# Patient Record
Sex: FEMALE | Race: WHITE | NOT HISPANIC OR LATINO | Employment: PART TIME | URBAN - METROPOLITAN AREA
[De-identification: names, ages, dates, MRNs, and addresses within clinical notes are randomized per-mention and may not be internally consistent; named-entity substitution may affect disease eponyms.]

---

## 2017-11-27 PROBLEM — K21.9 GASTROESOPHAGEAL REFLUX: Status: ACTIVE | Noted: 2017-11-27

## 2018-10-31 ENCOUNTER — OFFICE VISIT (OUTPATIENT)
Dept: URGENT CARE | Facility: CLINIC | Age: 73
End: 2018-10-31
Payer: MEDICARE

## 2018-10-31 VITALS
HEIGHT: 67 IN | DIASTOLIC BLOOD PRESSURE: 78 MMHG | SYSTOLIC BLOOD PRESSURE: 140 MMHG | HEART RATE: 58 BPM | WEIGHT: 156 LBS | BODY MASS INDEX: 24.48 KG/M2 | RESPIRATION RATE: 18 BRPM | TEMPERATURE: 98 F | OXYGEN SATURATION: 99 %

## 2018-10-31 DIAGNOSIS — S39.012A SACROILIAC STRAIN, INITIAL ENCOUNTER: Primary | ICD-10-CM

## 2018-10-31 PROCEDURE — 96372 THER/PROPH/DIAG INJ SC/IM: CPT | Mod: 59,S$GLB,, | Performed by: FAMILY MEDICINE

## 2018-10-31 PROCEDURE — 3077F SYST BP >= 140 MM HG: CPT | Mod: CPTII,S$GLB,, | Performed by: FAMILY MEDICINE

## 2018-10-31 PROCEDURE — 99214 OFFICE O/P EST MOD 30 MIN: CPT | Mod: 25,S$GLB,, | Performed by: FAMILY MEDICINE

## 2018-10-31 PROCEDURE — 3078F DIAST BP <80 MM HG: CPT | Mod: CPTII,S$GLB,, | Performed by: FAMILY MEDICINE

## 2018-10-31 RX ORDER — BETAMETHASONE SODIUM PHOSPHATE AND BETAMETHASONE ACETATE 3; 3 MG/ML; MG/ML
6 INJECTION, SUSPENSION INTRA-ARTICULAR; INTRALESIONAL; INTRAMUSCULAR; SOFT TISSUE
Status: COMPLETED | OUTPATIENT
Start: 2018-10-31 | End: 2018-10-31

## 2018-10-31 RX ADMIN — BETAMETHASONE SODIUM PHOSPHATE AND BETAMETHASONE ACETATE 6 MG: 3; 3 INJECTION, SUSPENSION INTRA-ARTICULAR; INTRALESIONAL; INTRAMUSCULAR; SOFT TISSUE at 02:10

## 2018-10-31 NOTE — PROGRESS NOTES
"Subjective:       Patient ID: Nany Pratt is a 73 y.o. female.    Vitals:  height is 5' 6.5" (1.689 m) and weight is 70.8 kg (156 lb). Her temperature is 97.5 °F (36.4 °C). Her blood pressure is 140/78 (abnormal) and her pulse is 58 (abnormal). Her respiration is 18 and oxygen saturation is 99%.     Chief Complaint: Hip Pain    Right hip started hurting yesterday, no injury. Took Tylenol and pain relieving cream.      Hip Pain    The incident occurred 12 to 24 hours ago. There was no injury mechanism. The pain is present in the right hip. The quality of the pain is described as aching. The pain is at a severity of 9/10. She reports no foreign bodies present. The symptoms are aggravated by palpation and weight bearing. She has tried NSAIDs for the symptoms. The treatment provided mild relief.     Review of Systems   Constitution: Negative for chills and fever.   HENT: Negative for sore throat.    Eyes: Negative for blurred vision.   Cardiovascular: Negative for chest pain.   Respiratory: Negative for shortness of breath.    Skin: Negative for rash.   Musculoskeletal: Positive for joint pain. Negative for back pain.   Gastrointestinal: Negative for abdominal pain, diarrhea, nausea and vomiting.   Neurological: Negative for headaches.   Psychiatric/Behavioral: The patient is not nervous/anxious.        Objective:      Physical Exam   Constitutional: She is oriented to person, place, and time. Vital signs are normal. She appears well-developed and well-nourished. She is active and cooperative. No distress.   HENT:   Head: Normocephalic and atraumatic.   Nose: Nose normal.   Mouth/Throat: Oropharynx is clear and moist and mucous membranes are normal.   Eyes: Conjunctivae and lids are normal.   Neck: Trachea normal, normal range of motion, full passive range of motion without pain and phonation normal. Neck supple.   Cardiovascular: Normal rate, regular rhythm, normal heart sounds, intact distal pulses and normal " pulses.   Pulmonary/Chest: Effort normal and breath sounds normal.   Abdominal: Soft. Normal appearance and bowel sounds are normal. She exhibits no abdominal bruit, no pulsatile midline mass and no mass.   Musculoskeletal: She exhibits tenderness. She exhibits no edema or deformity.   Tenderness in the right sacroiliac joint region no radiation into the hip or leg.  Straight leg raising negative bilaterally.  Lower extremity strength is normal pulses 2+ bilaterally   Neurological: She is alert and oriented to person, place, and time. She has normal strength and normal reflexes. No sensory deficit.   Skin: Skin is warm, dry and intact. She is not diaphoretic.   Psychiatric: She has a normal mood and affect. Her speech is normal and behavior is normal. Judgment and thought content normal. Cognition and memory are normal.   Nursing note and vitals reviewed.      Assessment:       1. Sacroiliac strain, initial encounter        Plan:         Sacroiliac strain, initial encounter    Other orders  -     betamethasone acetate-betamethasone sodium phosphate injection 6 mg     Range of motion exercises.  Sacroiliac strain precautions reviewed and literature provided.  Patient is stable for discharge.

## 2018-10-31 NOTE — PATIENT INSTRUCTIONS
Understanding Sacroiliac Strain    A joint is a place where 2 bones meet. The 2 sacroiliac joints are where the hip (iliac) bones meet the bottom part of the spine (sacrum). These joints are surrounded by muscle, connective tissue, and nerves. Normally, a sacroiliac joint (SIJ) does not move very much. But it can be pushed out of alignment. The tissues around an SIJ also can be stretched or torn. This can lead to pain in the low back.     How to say it  OSI-th-RU-alberto-ak strain   Causes of sacroiliac strain  Causes of SIJ strain can include:  · Stress on the SIJ from lifting weight incorrectly  · Poor body mechanics and posture during sports or work activities  · Damage from degenerative diseases such as arthritis  · Increased pressure on the SIJ from pregnancy  Symptoms of sacroiliac strain  Symptoms of SIJ strain may include:  · Aching in the low back, buttocks, or upper leg  · Pain that gets worse with movement or standing for a long time, and gets better with rest  · Inability to move as freely as usual  · Muscle spasms in the low back  Treating sacroiliac strain  Treatment focuses on reducing pain and avoiding further injury. Treatments may include:  · Prescription or over-the-counter pain medicines. These help reduce pain and swelling.  · Cold packs or heat packs. These help reduce pain and swelling.  · Stretching and other exercises. These improve flexibility and strength.  · Physical therapy. This may include exercises or other treatments.  · An SIJ belt. This medical device is worn around the hips, to make the SIJ more stable and reduce pain.  · Injections of medicine. This may relieve symptoms.  Possible complications of sacroiliac strain  If the cause of the pain is not addressed, symptoms may return or get worse. Follow your healthcare providers instructions on lifestyle changes and treating your SIJ strain.     When to call your healthcare provider  Call your healthcare provider right away if you have  any of these:  · Fever of 100.4°F (38°C) or higher, or as directed  · Redness or swelling  · Pain that gets worse  · Symptoms that dont get better with prescribed medicines, or get worse  · New symptoms   Date Last Reviewed: 3/10/2016  © 6348-8048 [employer]. [address] [place] [id number]. All rights reserved. This information is not intended as a substitute for professional medical care. Always follow your healthcare professional's instructions.

## 2019-03-01 ENCOUNTER — LAB VISIT (OUTPATIENT)
Dept: LAB | Facility: HOSPITAL | Age: 74
End: 2019-03-01
Attending: INTERNAL MEDICINE
Payer: MEDICARE

## 2019-03-01 ENCOUNTER — OFFICE VISIT (OUTPATIENT)
Dept: ENDOCRINOLOGY | Facility: CLINIC | Age: 74
End: 2019-03-01
Payer: MEDICARE

## 2019-03-01 VITALS
SYSTOLIC BLOOD PRESSURE: 140 MMHG | DIASTOLIC BLOOD PRESSURE: 64 MMHG | WEIGHT: 157.44 LBS | HEART RATE: 61 BPM | BODY MASS INDEX: 24.71 KG/M2 | HEIGHT: 67 IN

## 2019-03-01 DIAGNOSIS — R53.83 FATIGUE, UNSPECIFIED TYPE: ICD-10-CM

## 2019-03-01 DIAGNOSIS — E03.9 HYPOTHYROIDISM, UNSPECIFIED TYPE: ICD-10-CM

## 2019-03-01 DIAGNOSIS — E03.9 HYPOTHYROIDISM, UNSPECIFIED TYPE: Primary | ICD-10-CM

## 2019-03-01 DIAGNOSIS — I10 BENIGN ESSENTIAL HTN: ICD-10-CM

## 2019-03-01 LAB
BASOPHILS # BLD AUTO: 0.05 K/UL
BASOPHILS NFR BLD: 1 %
DIFFERENTIAL METHOD: ABNORMAL
EOSINOPHIL # BLD AUTO: 0.1 K/UL
EOSINOPHIL NFR BLD: 2.5 %
ERYTHROCYTE [DISTWIDTH] IN BLOOD BY AUTOMATED COUNT: 12.4 %
HCT VFR BLD AUTO: 36.8 %
HGB BLD-MCNC: 11.9 G/DL
IMM GRANULOCYTES # BLD AUTO: 0.01 K/UL
IMM GRANULOCYTES NFR BLD AUTO: 0.2 %
LYMPHOCYTES # BLD AUTO: 1.2 K/UL
LYMPHOCYTES NFR BLD: 23.5 %
MCH RBC QN AUTO: 32.8 PG
MCHC RBC AUTO-ENTMCNC: 32.3 G/DL
MCV RBC AUTO: 101 FL
MONOCYTES # BLD AUTO: 0.6 K/UL
MONOCYTES NFR BLD: 11.5 %
NEUTROPHILS # BLD AUTO: 3.2 K/UL
NEUTROPHILS NFR BLD: 61.3 %
NRBC BLD-RTO: 0 /100 WBC
PLATELET # BLD AUTO: 195 K/UL
PMV BLD AUTO: 11.9 FL
RBC # BLD AUTO: 3.63 M/UL
T4 FREE SERPL-MCNC: 1.27 NG/DL
TSH SERPL DL<=0.005 MIU/L-ACNC: 1.86 UIU/ML
WBC # BLD AUTO: 5.24 K/UL

## 2019-03-01 PROCEDURE — 84439 ASSAY OF FREE THYROXINE: CPT

## 2019-03-01 PROCEDURE — 36415 COLL VENOUS BLD VENIPUNCTURE: CPT | Mod: PO

## 2019-03-01 PROCEDURE — 3077F PR MOST RECENT SYSTOLIC BLOOD PRESSURE >= 140 MM HG: ICD-10-PCS | Mod: CPTII,S$GLB,, | Performed by: INTERNAL MEDICINE

## 2019-03-01 PROCEDURE — 85025 COMPLETE CBC W/AUTO DIFF WBC: CPT

## 2019-03-01 PROCEDURE — 3077F SYST BP >= 140 MM HG: CPT | Mod: CPTII,S$GLB,, | Performed by: INTERNAL MEDICINE

## 2019-03-01 PROCEDURE — 99204 PR OFFICE/OUTPT VISIT, NEW, LEVL IV, 45-59 MIN: ICD-10-PCS | Mod: S$GLB,,, | Performed by: INTERNAL MEDICINE

## 2019-03-01 PROCEDURE — 99204 OFFICE O/P NEW MOD 45 MIN: CPT | Mod: S$GLB,,, | Performed by: INTERNAL MEDICINE

## 2019-03-01 PROCEDURE — 84443 ASSAY THYROID STIM HORMONE: CPT

## 2019-03-01 PROCEDURE — 1101F PT FALLS ASSESS-DOCD LE1/YR: CPT | Mod: CPTII,S$GLB,, | Performed by: INTERNAL MEDICINE

## 2019-03-01 PROCEDURE — 1101F PR PT FALLS ASSESS DOC 0-1 FALLS W/OUT INJ PAST YR: ICD-10-PCS | Mod: CPTII,S$GLB,, | Performed by: INTERNAL MEDICINE

## 2019-03-01 PROCEDURE — 3078F PR MOST RECENT DIASTOLIC BLOOD PRESSURE < 80 MM HG: ICD-10-PCS | Mod: CPTII,S$GLB,, | Performed by: INTERNAL MEDICINE

## 2019-03-01 PROCEDURE — 99999 PR PBB SHADOW E&M-EST. PATIENT-LVL III: CPT | Mod: PBBFAC,,, | Performed by: INTERNAL MEDICINE

## 2019-03-01 PROCEDURE — 3078F DIAST BP <80 MM HG: CPT | Mod: CPTII,S$GLB,, | Performed by: INTERNAL MEDICINE

## 2019-03-01 PROCEDURE — 99999 PR PBB SHADOW E&M-EST. PATIENT-LVL III: ICD-10-PCS | Mod: PBBFAC,,, | Performed by: INTERNAL MEDICINE

## 2019-03-01 NOTE — PROGRESS NOTES
CHIEF COMPLAINT: Hypothyroid  73 year old being seen as a new patient. Hypothyroid since 50's. On synthroid 75 mcg. States that has been fatigued. Celexa has been increased. Overall sleeping well at night- 7-8 hours. Has had a sleep study. Was told that it was borderline and told CPAP could be tried but decided against. Takes celexa in AM. On beta blocker. Takes synthroid by itself. Has not missed doses. Scheduled to see cardiology.       PAST MEDICAL HISTORY/PAST SURGICAL HISTORY:  Reviewed in [facility]    SOCIAL HISTORY: NO T/A    FAMILY HISTORY:  Daughter with hypothyroidism. No thyroid cancer. Sister partial thyroidectomy. + DM2.     MEDICATIONS/ALLERGIES: The patient's MedCard has been updated and reviewed.      ROS:   Constitutional: weight stable.   Eyes: No recent visual changes  ENT: No dysphagia  Cardiovascular: Denies current anginal symptoms  Respiratory: Denies current respiratory difficulty  Gastrointestinal: Denies recent bowel disturbances  GenitoUrinary - No dysuria  Skin: No new skin rash  Neurologic: No focal neurologic complaints  Remainder ROS negative        PE:    GENERAL: Well developed, well nourished.  PSYCH:  appropriate mood and affect  EYES:  PERRL, EOM intact.  ENT: Nares patent, oropharynx clear, mucosa pink,   NECK: Supple, trachea midline, No palpable thyroid nodules.   CHEST: Resp even and unlabored, CTA bilateral.  CARDIAC: RRR, S1, S2 heard, no murmurs, rubs, S3, or S4  ABDOMEN: Soft, non-tender, non-distended;  No organomegaly  VASCULAR:  DP pulses +2/4 bilaterally, no edema  NEURO: Gait steady, CN II-VII grossly intact  SKIN: No areas of breakdown, no acanthosis nigracans.    LABS   Results for ELISEO MUELLER (MRN [id number]) as of 3/1/2019 11:14   Ref. Range 10/25/2018 10:44 1/30/2019 15:02   TSH Latest Ref Range: 0.400 - 4.000 uIU/mL 6.640 (H) 4.780 (H)   Free T4 Latest Ref Range: 0.78 - 2.19 ng/dL  1.39     Ultrasound of the thyroid    Clinical history is palpable lesion    This  is compared to a previous study from 4/20/15 is not measures 0.2 cm.    The right lobe measures 1.5 x 3.9 x 0.8 cm. No focal lesions are seen.    The left lobe measures 1.1 x 3.8 x 1.1 cm. No focal lesions are seen. On the previous exam there is a small cyst in the isthmus this is no longer visualized.    The echotexture of both lobes is heterogeneous.      Impression      No focal nodules are identified.           ASSESSMENT/PLAN:  1. Hypothyroid- Check TFT to see if contributing to her fatigue    2. Fatigue- See # 1. Discussed importance of sleep, stress, diet. Discussed beta blockers can contribute to fatigue. Discussed trying to take celexa in evening to see if that helps.     3. HTN- states will discuss beta blocker with cardiology.    FOLLOWUP  TSH, Ft4, CBC- today.

## 2019-03-01 NOTE — LETTER
March 6, 2019      ROSSY Gary Jr., MD  [address]  [facility] [address]           [facility]  [address] [facility] [address] [place] [address]  Phone: [phone]  Fax: [phone]          Patient: Nany Partt   MR Number: [id number]   YOB: 1945   Date of Visit: 3/1/2019       Dear Dr. ROSSY Gary Jr.:    Thank you for referring Nany Pratt to me for evaluation. Attached you will find relevant portions of my assessment and plan of care.    If you have questions, please do not hesitate to call me. I look forward to following Nany Pratt along with you.    Sincerely,    Modesto Deluna,     Enclosure  CC:  No Recipients    If you would like to receive this communication electronically, please contact [email or web address] or [phone] to request more information on [employer] access.    For providers and/or their staff who would like to refer a patient to [facility], please contact us through our one-stop-shop provider referral line, [facility] , at [phone].    If you feel you have received this communication in error or would no longer like to receive these types of communications, please e-mail [email or web address]

## 2019-03-05 ENCOUNTER — TELEPHONE (OUTPATIENT)
Dept: ENDOCRINOLOGY | Facility: CLINIC | Age: 74
End: 2019-03-05

## 2019-03-05 NOTE — TELEPHONE ENCOUNTER
Let her know that her thyroid levels are normal. He is slightly anemic. Will need to discuss with PCP for further w/u. Since mildly anemic unsure if that is contributing to fatigue

## 2019-03-06 ENCOUNTER — TELEPHONE (OUTPATIENT)
Dept: ENDOCRINOLOGY | Facility: CLINIC | Age: 74
End: 2019-03-06

## 2019-03-06 NOTE — TELEPHONE ENCOUNTER
----- Message from Jonnie York sent at 3/6/2019 10:35 AM CST -----  Contact: same  Type:  Test Results    Who Called:  patient  Name of Test (Lab/Mammo/Etc):  labs  Date of Test:  3/1/19  Ordering Provider:  Mikie  Where the test was performed:  [address] [facility] [address]  Best Call Back Number:  [phone]  Additional Information:  n/a

## 2019-08-28 PROBLEM — R59.0 ANTERIOR CERVICAL ADENOPATHY: Status: ACTIVE | Noted: 2019-08-28

## 2019-08-28 PROBLEM — E04.1 THYROID NODULE: Status: ACTIVE | Noted: 2019-08-28

## 2019-08-28 PROBLEM — I65.23 ATHEROSCLEROSIS OF BOTH CAROTID ARTERIES: Status: ACTIVE | Noted: 2019-08-28

## 2019-09-13 PROBLEM — E04.1 NONTOXIC UNINODULAR GOITER: Status: RESOLVED | Noted: 2019-08-28 | Resolved: 2019-09-13

## 2020-03-09 PROBLEM — K58.9 IBS (IRRITABLE BOWEL SYNDROME): Status: ACTIVE | Noted: 2020-03-09

## 2020-09-10 PROBLEM — D64.9 ANEMIA: Status: ACTIVE | Noted: 2020-09-10

## 2020-09-10 PROBLEM — K21.9 GASTROESOPHAGEAL REFLUX: Status: RESOLVED | Noted: 2017-11-27 | Resolved: 2020-09-10

## 2020-09-10 PROBLEM — R59.0 ANTERIOR CERVICAL ADENOPATHY: Status: RESOLVED | Noted: 2019-08-28 | Resolved: 2020-09-10

## 2021-03-29 ENCOUNTER — DOCUMENT SCAN (OUTPATIENT)
Dept: HOME HEALTH SERVICES | Facility: HOSPITAL | Age: 76
End: 2021-03-29

## 2021-04-26 ENCOUNTER — EXTERNAL HOME HEALTH (OUTPATIENT)
Dept: HOME HEALTH SERVICES | Facility: HOSPITAL | Age: 76
End: 2021-04-26

## 2021-05-19 PROBLEM — J69.0 ASPIRATION PNEUMONITIS: Status: ACTIVE | Noted: 2021-05-19

## 2021-05-19 PROBLEM — T14.91XA SUICIDE ATTEMPT: Status: ACTIVE | Noted: 2021-05-19

## 2021-05-19 PROBLEM — Z71.89 ACP (ADVANCE CARE PLANNING): Status: ACTIVE | Noted: 2021-05-19

## 2021-05-19 PROBLEM — K92.0 HEMATEMESIS: Status: ACTIVE | Noted: 2021-05-19

## 2021-05-19 PROBLEM — R09.2 RESPIRATORY ARREST: Status: ACTIVE | Noted: 2021-05-19

## 2021-05-24 PROBLEM — R41.0 CONFUSION: Status: ACTIVE | Noted: 2021-05-24

## 2021-05-24 PROBLEM — R33.9 URINE RETENTION: Status: ACTIVE | Noted: 2021-05-24

## 2021-05-25 PROBLEM — E87.6 HYPOKALEMIA: Status: ACTIVE | Noted: 2021-05-25

## 2021-05-25 PROBLEM — I48.91 ATRIAL FIBRILLATION WITH RVR: Status: ACTIVE | Noted: 2021-05-25

## 2021-05-28 PROBLEM — E83.39 HYPOPHOSPHATEMIA: Status: ACTIVE | Noted: 2021-05-28

## 2021-05-30 PROBLEM — R19.5 LOOSE STOOLS: Status: ACTIVE | Noted: 2021-05-30

## 2021-06-01 PROBLEM — G92.9 TOXIC ENCEPHALOPATHY: Status: ACTIVE | Noted: 2021-05-24

## 2021-06-01 PROBLEM — E83.39 HYPOPHOSPHATEMIA: Status: RESOLVED | Noted: 2021-05-28 | Resolved: 2021-06-01

## 2021-06-01 PROBLEM — J69.0 ASPIRATION PNEUMONITIS: Status: RESOLVED | Noted: 2021-05-19 | Resolved: 2021-06-01

## 2021-06-01 PROBLEM — R09.2 RESPIRATORY ARREST: Status: RESOLVED | Noted: 2021-05-19 | Resolved: 2021-06-01

## 2021-06-01 PROBLEM — G92.9 TOXIC ENCEPHALOPATHY: Status: RESOLVED | Noted: 2021-05-24 | Resolved: 2021-06-01

## 2021-06-01 PROBLEM — I48.91 ATRIAL FIBRILLATION WITH RVR: Status: RESOLVED | Noted: 2021-05-25 | Resolved: 2021-06-01

## 2021-06-01 PROBLEM — E87.6 HYPOKALEMIA: Status: RESOLVED | Noted: 2021-05-25 | Resolved: 2021-06-01

## 2021-06-01 PROBLEM — T14.91XA SUICIDE ATTEMPT: Status: RESOLVED | Noted: 2021-05-19 | Resolved: 2021-06-01

## 2021-06-01 PROBLEM — K92.0 HEMATEMESIS: Status: RESOLVED | Noted: 2021-05-19 | Resolved: 2021-06-01

## 2021-11-15 ENCOUNTER — TELEPHONE (OUTPATIENT)
Dept: OBSTETRICS AND GYNECOLOGY | Facility: CLINIC | Age: 76
End: 2021-11-15
Payer: MEDICARE

## 2021-11-16 ENCOUNTER — TELEPHONE (OUTPATIENT)
Dept: OBSTETRICS AND GYNECOLOGY | Facility: CLINIC | Age: 76
End: 2021-11-16
Payer: MEDICARE

## 2021-11-16 DIAGNOSIS — Z01.419 ENCOUNTER FOR ANNUAL ROUTINE GYNECOLOGICAL EXAMINATION: Primary | ICD-10-CM

## 2021-11-16 DIAGNOSIS — M85.88 OSTEOPENIA OF LUMBAR SPINE: ICD-10-CM

## 2022-01-28 ENCOUNTER — LAB VISIT (OUTPATIENT)
Dept: PRIMARY CARE CLINIC | Facility: OTHER | Age: 77
End: 2022-01-28
Payer: MEDICARE

## 2022-01-28 DIAGNOSIS — Z20.822 ENCOUNTER FOR LABORATORY TESTING FOR COVID-19 VIRUS: ICD-10-CM

## 2022-01-28 PROCEDURE — U0003 INFECTIOUS AGENT DETECTION BY NUCLEIC ACID (DNA OR RNA); SEVERE ACUTE RESPIRATORY SYNDROME CORONAVIRUS 2 (SARS-COV-2) (CORONAVIRUS DISEASE [COVID-19]), AMPLIFIED PROBE TECHNIQUE, MAKING USE OF HIGH THROUGHPUT TECHNOLOGIES AS DESCRIBED BY CMS-2020-01-R: HCPCS

## 2022-01-29 LAB
SARS-COV-2 RNA RESP QL NAA+PROBE: NOT DETECTED
SARS-COV-2- CYCLE NUMBER: NORMAL

## 2022-07-30 PROBLEM — K58.9 IBS (IRRITABLE BOWEL SYNDROME): Chronic | Status: ACTIVE | Noted: 2020-03-09

## 2022-07-30 PROBLEM — K21.9 GASTROESOPHAGEAL REFLUX: Chronic | Status: RESOLVED | Noted: 2017-11-27 | Resolved: 2020-09-10

## 2022-08-01 PROBLEM — F32.2 SEVERE MAJOR DEPRESSION: Status: ACTIVE | Noted: 2022-08-01

## 2022-09-27 ENCOUNTER — TELEPHONE (OUTPATIENT)
Dept: OBSTETRICS AND GYNECOLOGY | Facility: CLINIC | Age: 77
End: 2022-09-27
Payer: MEDICARE

## 2022-09-27 DIAGNOSIS — Z12.31 ENCOUNTER FOR SCREENING MAMMOGRAM FOR MALIGNANT NEOPLASM OF BREAST: Primary | ICD-10-CM

## 2022-09-27 NOTE — TELEPHONE ENCOUNTER
----- Message from Marguerite Hagan sent at 9/27/2022  2:05 PM CDT -----  Type: Needs Medical Advice  Who Called: Pt   Symptoms (please be specific):   How long has patient had these symptoms:    Pharmacy name and phone #:    Best Call Back Number: [phone]  Additional Information: Pt requesting a call back concerning getting Mammogram Orders.

## 2022-10-25 ENCOUNTER — OFFICE VISIT (OUTPATIENT)
Dept: OBSTETRICS AND GYNECOLOGY | Facility: CLINIC | Age: 77
End: 2022-10-25
Payer: MEDICARE

## 2022-10-25 VITALS
SYSTOLIC BLOOD PRESSURE: 144 MMHG | BODY MASS INDEX: 28.29 KG/M2 | WEIGHT: 175.25 LBS | DIASTOLIC BLOOD PRESSURE: 90 MMHG

## 2022-10-25 DIAGNOSIS — Z01.419 ENCOUNTER FOR ANNUAL ROUTINE GYNECOLOGICAL EXAMINATION: ICD-10-CM

## 2022-10-25 DIAGNOSIS — Z12.4 SCREENING FOR MALIGNANT NEOPLASM OF CERVIX: Primary | ICD-10-CM

## 2022-10-25 PROCEDURE — 1160F RVW MEDS BY RX/DR IN RCRD: CPT | Mod: CPTII,S$GLB,, | Performed by: OBSTETRICS & GYNECOLOGY

## 2022-10-25 PROCEDURE — 3080F DIAST BP >= 90 MM HG: CPT | Mod: CPTII,S$GLB,, | Performed by: OBSTETRICS & GYNECOLOGY

## 2022-10-25 PROCEDURE — 1159F PR MEDICATION LIST DOCUMENTED IN MEDICAL RECORD: ICD-10-PCS | Mod: CPTII,S$GLB,, | Performed by: OBSTETRICS & GYNECOLOGY

## 2022-10-25 PROCEDURE — 1160F PR REVIEW ALL MEDS BY PRESCRIBER/CLIN PHARMACIST DOCUMENTED: ICD-10-PCS | Mod: CPTII,S$GLB,, | Performed by: OBSTETRICS & GYNECOLOGY

## 2022-10-25 PROCEDURE — 3288F PR FALLS RISK ASSESSMENT DOCUMENTED: ICD-10-PCS | Mod: CPTII,S$GLB,, | Performed by: OBSTETRICS & GYNECOLOGY

## 2022-10-25 PROCEDURE — 1126F PR PAIN SEVERITY QUANTIFIED, NO PAIN PRESENT: ICD-10-PCS | Mod: CPTII,S$GLB,, | Performed by: OBSTETRICS & GYNECOLOGY

## 2022-10-25 PROCEDURE — 99999 PR PBB SHADOW E&M-EST. PATIENT-LVL III: CPT | Mod: PBBFAC,,, | Performed by: OBSTETRICS & GYNECOLOGY

## 2022-10-25 PROCEDURE — 1101F PT FALLS ASSESS-DOCD LE1/YR: CPT | Mod: CPTII,S$GLB,, | Performed by: OBSTETRICS & GYNECOLOGY

## 2022-10-25 PROCEDURE — 3077F PR MOST RECENT SYSTOLIC BLOOD PRESSURE >= 140 MM HG: ICD-10-PCS | Mod: CPTII,S$GLB,, | Performed by: OBSTETRICS & GYNECOLOGY

## 2022-10-25 PROCEDURE — 99999 PR PBB SHADOW E&M-EST. PATIENT-LVL III: ICD-10-PCS | Mod: PBBFAC,,, | Performed by: OBSTETRICS & GYNECOLOGY

## 2022-10-25 PROCEDURE — 1159F MED LIST DOCD IN RCRD: CPT | Mod: CPTII,S$GLB,, | Performed by: OBSTETRICS & GYNECOLOGY

## 2022-10-25 PROCEDURE — 3288F FALL RISK ASSESSMENT DOCD: CPT | Mod: CPTII,S$GLB,, | Performed by: OBSTETRICS & GYNECOLOGY

## 2022-10-25 PROCEDURE — 3077F SYST BP >= 140 MM HG: CPT | Mod: CPTII,S$GLB,, | Performed by: OBSTETRICS & GYNECOLOGY

## 2022-10-25 PROCEDURE — 1126F AMNT PAIN NOTED NONE PRSNT: CPT | Mod: CPTII,S$GLB,, | Performed by: OBSTETRICS & GYNECOLOGY

## 2022-10-25 PROCEDURE — G0101 PR CA SCREEN;PELVIC/BREAST EXAM: ICD-10-PCS | Mod: S$GLB,,, | Performed by: OBSTETRICS & GYNECOLOGY

## 2022-10-25 PROCEDURE — 3080F PR MOST RECENT DIASTOLIC BLOOD PRESSURE >= 90 MM HG: ICD-10-PCS | Mod: CPTII,S$GLB,, | Performed by: OBSTETRICS & GYNECOLOGY

## 2022-10-25 PROCEDURE — 88175 CYTOPATH C/V AUTO FLUID REDO: CPT | Performed by: OBSTETRICS & GYNECOLOGY

## 2022-10-25 PROCEDURE — G0101 CA SCREEN;PELVIC/BREAST EXAM: HCPCS | Mod: S$GLB,,, | Performed by: OBSTETRICS & GYNECOLOGY

## 2022-10-25 PROCEDURE — 1101F PR PT FALLS ASSESS DOC 0-1 FALLS W/OUT INJ PAST YR: ICD-10-PCS | Mod: CPTII,S$GLB,, | Performed by: OBSTETRICS & GYNECOLOGY

## 2022-10-25 RX ORDER — NYSTATIN AND TRIAMCINOLONE ACETONIDE 100000; 1 [USP'U]/G; MG/G
CREAM TOPICAL
Qty: 30 G | Refills: 1 | Status: SHIPPED | OUTPATIENT
Start: 2022-10-25 | End: 2023-01-13 | Stop reason: ALTCHOICE

## 2022-10-25 RX ORDER — CLOBETASOL PROPIONATE 0.5 MG/G
CREAM TOPICAL 2 TIMES DAILY
Qty: 1 G | Refills: 1 | Status: SHIPPED | OUTPATIENT
Start: 2022-10-25 | End: 2023-01-13 | Stop reason: ALTCHOICE

## 2022-10-25 NOTE — PROGRESS NOTES
Chief Complaint   Patient presents with    [facility]    Well Woman    Vulvar Itch     C/o itching on/off        History and Physical:  No LMP recorded. Patient has had a hysterectomy.       Nany Pratt is a 77 y.o. WF who presents today for her routine annual GYN exam. The patient has no Gynecology complaints today. hypothyroid      Allergies:   Review of patient's allergies indicates:   Allergen Reactions    Budeprion sr [bupropion hcl] Palpitations    Codeine     Effexor [venlafaxine] Other (See Comments)     vomiting    Sulfa (sulfonamide antibiotics)        Past Medical History:   Diagnosis Date    Depression     Disorder of bone and cartilage, unspecified     Dyslipidemia 9/30/2015    Gastric ulcer, unspecified as acute or chronic, without mention of hemorrhage, perforation, or obstruction     GERD (gastroesophageal reflux disease)     Hypertension     Hypothyroidism     Irritable bowel syndrome     Irritable bowel syndrome with constipation and diarrhea     Mental disorder     Mitral valve insufficiency and aortic valve insufficiency     Nontoxic uninodular goiter     Occlusion and stenosis of carotid artery without mention of cerebral infarction     Paroxysmal tachycardia, unspecified     Premature supraventricular beats 9/30/2015    Rheumatic tricuspid valve regurgitation     Symptomatic menopausal or female climacteric states        Past Surgical History:   Procedure Laterality Date    APPENDECTOMY      AUGMENTATION OF BREAST      BREAST BIOPSY Left     over 15 years ago    BREAST SURGERY Bilateral     implants ruptured right implant    COLONOSCOPY  05/26/2011    Done by Dr. OSVALDO Fabian -- report in [facility]    COLONOSCOPY N/A 6/20/2016    Procedure: COLONOSCOPY;  Surgeon: Luca Quevedo MD;  Location: [facility];  Service: Endoscopy;  Laterality: N/A;    COLONOSCOPY  12/08/2021    Dr. Quevedo    HYSTERECTOMY      TOTAL ABDOMINAL HYSTERECTOMY W/ BILATERAL SALPINGOOPHORECTOMY      age 34    TOTAL  KNEE ARTHROPLASTY Right 03/16/2021    Done by Dr. Gray at [facility]:   Current Outpatient Medications on File Prior to Visit   Medication Sig Dispense Refill    DULoxetine 40 mg CpDR Take 1 capsule by mouth once daily.      gabapentin (NEURONTIN) 100 MG capsule TAKE 2 CAPSULES BY MOUTH 3 TIMES A DAY FOR MOOD ANXIETY/MOOD/PAIN      levothyroxine (SYNTHROID) 75 MCG tablet TAKE 1 TABLET(75 MCG) BY MOUTH BEFORE BREAKFAST 90 tablet 3    metoprolol succinate (TOPROL-XL) 50 MG 24 hr tablet TAKE 1 TABLET BY MOUTH EVERY DAY 30 tablet 6    mirtazapine (REMERON) 30 MG tablet Take 30 mg by mouth every evening.      multivitamin capsule Take 1 capsule by mouth once daily.      UNABLE TO FIND Take 500 mg by mouth once daily at 6am. Viactive 500 mg 1 Tablet Daily       Current Facility-Administered Medications on File Prior to Visit   Medication Dose Route Frequency Provider Last Rate Last Admin    EUFLEXXA injection Syrg 20 mg  20 mg Intra-articular  Hayden Ramirez MD   20 mg at 03/14/17 1640       OB History    No obstetric history on file.         Social History     Socioeconomic History    Marital status:      Spouse name: Evan    Number of children: 2   Occupational History    Occupation: retired   Tobacco Use    Smoking status: Never    Smokeless tobacco: Never   Substance and Sexual Activity    Alcohol use: Not Currently     Alcohol/week: 1.0 standard drink     Types: 1 Glasses of wine per week     Comment: rare    Drug use: Never    Sexual activity: Yes     Partners: Male     Birth control/protection: Post-menopausal, See Surgical Hx, None       Family History   Problem Relation Age of Onset    Hypertension Mother     Osteoarthritis Mother     Diabetes Father     Hypertension Father     Stroke Father     Cancer Sister          Past medical and surgical history reviewed.   I have reviewed the patient's medical history in detail and updated the computerized patient record.        Review of System:   General:  no chills, fever, night sweats, weight gain or weight loss  Psychological: no depression or suicidal ideation  Breasts: no new or changing breast lumps, nipple discharge or masses.  Respiratory: no cough, shortness of breath, or wheezing  Cardiovascular: no chest pain or dyspnea on exertion  Gastrointestinal: no abdominal pain, change in bowel habits, or black or bloody stools  Genito-Urinary: no incontinence, urinary frequency/urgency or vulvar/vaginal symptoms, pelvic pain or abnormal vaginal bleeding.  Musculoskeletal: no gait disturbance or muscular weakness      Physical Exam:   BP (!) 144/90   Wt 79.5 kg (175 lb 4.3 oz)   BMI 28.29 kg/m²   Constitutional: She is oriented to person, place, and time. She appears well-developed and well-nourished. No distress.  HENT:   Head: Normocephalic and atraumatic.   Eyes: Conjunctivae and EOM are normal. No scleral icterus.   Neck: Normal range of motion. Neck supple. No tracheal deviation present.   Cardiovascular: Normal rate.    Pulmonary/Chest: Effort normal. No respiratory distress. She exhibits no tenderness.  Breasts: are symmetrical.no masses   Right breast exhibits no inverted nipple, no mass, no nipple discharge, no skin change and no tenderness.   Left breast exhibits no inverted nipple, no mass, no nipple discharge, no skin change and no tenderness.  Abdominal: Soft. She exhibits no distension and no mass. There is no tenderness. There is no rebound and no guarding.   Genitourinary:    External rectal exam shows no thrombosed external hemorrhoids.    Pelvic exam was performed with patient supine.   No labial fusion. Erythema at clitoris   There is no rash, lesion or injury on the right labia.   There is no rash, lesion or injury on the left labia.   No bleeding and no signs of injury around the vaginal introitus, urethra is without lesions and well supported.    No vaginal discharge found. Menopause  Hypothyroid     No significant Cystocele, Enterocele or  rectocele, and cuff well supported.   Bimanual exam:   The urethra and vagina are without palpable masses or tenderness.   Uterus and cervix are surgically absents, vaginal cuff is intact and well supported.   Right adnexum displays no mass and no tenderness.   Left adnexum displays no mass and no tenderness.  Musculoskeletal: Normal range of motion.   Lymphadenopathy: No inguinal adenopathy present.   Neurological: She is alert and oriented to person, place, and time. Coordination normal.   Skin: Skin is warm and dry. She is not diaphoretic.   Psychiatric: She has a normal mood and affect.        Assessment:   Normal annual GYN exam  1. Screening for malignant neoplasm of cervix        2. Encounter for annual routine gynecological examination        Vulvitis possible lichen sclerosus  Hypothyroid  Menopause  Hx anxiety and depression doing well    Plan:   Mycolog 1 week then clobetasol 1 week  PAP    Mammogram  Follow up in 2 -3 weeks

## 2022-11-15 ENCOUNTER — OFFICE VISIT (OUTPATIENT)
Dept: OBSTETRICS AND GYNECOLOGY | Facility: CLINIC | Age: 77
End: 2022-11-15
Payer: MEDICARE

## 2022-11-15 VITALS
SYSTOLIC BLOOD PRESSURE: 168 MMHG | DIASTOLIC BLOOD PRESSURE: 98 MMHG | BODY MASS INDEX: 28.36 KG/M2 | WEIGHT: 175.69 LBS

## 2022-11-15 DIAGNOSIS — N90.4 VULVAR DYSTROPHY: Primary | ICD-10-CM

## 2022-11-15 PROCEDURE — 3077F SYST BP >= 140 MM HG: CPT | Mod: CPTII,S$GLB,, | Performed by: OBSTETRICS & GYNECOLOGY

## 2022-11-15 PROCEDURE — 3288F FALL RISK ASSESSMENT DOCD: CPT | Mod: CPTII,S$GLB,, | Performed by: OBSTETRICS & GYNECOLOGY

## 2022-11-15 PROCEDURE — 3080F DIAST BP >= 90 MM HG: CPT | Mod: CPTII,S$GLB,, | Performed by: OBSTETRICS & GYNECOLOGY

## 2022-11-15 PROCEDURE — 99999 PR PBB SHADOW E&M-EST. PATIENT-LVL III: ICD-10-PCS | Mod: PBBFAC,,, | Performed by: OBSTETRICS & GYNECOLOGY

## 2022-11-15 PROCEDURE — 99213 PR OFFICE/OUTPT VISIT, EST, LEVL III, 20-29 MIN: ICD-10-PCS | Mod: S$GLB,,, | Performed by: OBSTETRICS & GYNECOLOGY

## 2022-11-15 PROCEDURE — 1126F AMNT PAIN NOTED NONE PRSNT: CPT | Mod: CPTII,S$GLB,, | Performed by: OBSTETRICS & GYNECOLOGY

## 2022-11-15 PROCEDURE — 1160F PR REVIEW ALL MEDS BY PRESCRIBER/CLIN PHARMACIST DOCUMENTED: ICD-10-PCS | Mod: CPTII,S$GLB,, | Performed by: OBSTETRICS & GYNECOLOGY

## 2022-11-15 PROCEDURE — 1160F RVW MEDS BY RX/DR IN RCRD: CPT | Mod: CPTII,S$GLB,, | Performed by: OBSTETRICS & GYNECOLOGY

## 2022-11-15 PROCEDURE — 3080F PR MOST RECENT DIASTOLIC BLOOD PRESSURE >= 90 MM HG: ICD-10-PCS | Mod: CPTII,S$GLB,, | Performed by: OBSTETRICS & GYNECOLOGY

## 2022-11-15 PROCEDURE — 1101F PT FALLS ASSESS-DOCD LE1/YR: CPT | Mod: CPTII,S$GLB,, | Performed by: OBSTETRICS & GYNECOLOGY

## 2022-11-15 PROCEDURE — 1159F MED LIST DOCD IN RCRD: CPT | Mod: CPTII,S$GLB,, | Performed by: OBSTETRICS & GYNECOLOGY

## 2022-11-15 PROCEDURE — 1126F PR PAIN SEVERITY QUANTIFIED, NO PAIN PRESENT: ICD-10-PCS | Mod: CPTII,S$GLB,, | Performed by: OBSTETRICS & GYNECOLOGY

## 2022-11-15 PROCEDURE — 99999 PR PBB SHADOW E&M-EST. PATIENT-LVL III: CPT | Mod: PBBFAC,,, | Performed by: OBSTETRICS & GYNECOLOGY

## 2022-11-15 PROCEDURE — 1101F PR PT FALLS ASSESS DOC 0-1 FALLS W/OUT INJ PAST YR: ICD-10-PCS | Mod: CPTII,S$GLB,, | Performed by: OBSTETRICS & GYNECOLOGY

## 2022-11-15 PROCEDURE — 1159F PR MEDICATION LIST DOCUMENTED IN MEDICAL RECORD: ICD-10-PCS | Mod: CPTII,S$GLB,, | Performed by: OBSTETRICS & GYNECOLOGY

## 2022-11-15 PROCEDURE — 99213 OFFICE O/P EST LOW 20 MIN: CPT | Mod: S$GLB,,, | Performed by: OBSTETRICS & GYNECOLOGY

## 2022-11-15 PROCEDURE — 3077F PR MOST RECENT SYSTOLIC BLOOD PRESSURE >= 140 MM HG: ICD-10-PCS | Mod: CPTII,S$GLB,, | Performed by: OBSTETRICS & GYNECOLOGY

## 2022-11-15 PROCEDURE — 3288F PR FALLS RISK ASSESSMENT DOCUMENTED: ICD-10-PCS | Mod: CPTII,S$GLB,, | Performed by: OBSTETRICS & GYNECOLOGY

## 2022-11-15 NOTE — PROGRESS NOTES
Chief Complaint   Patient presents with    Follow-up     Fu vaginal check after using cream       History of Present Illness   77 y.o. WF  No obstetric history on file. patient presents today for follow up vulvar dystrophy treated with temovate, got better    Past medical and surgical history reviewed.   I have reviewed the patient's medical history in detail and updated the computerized patient record.    Review of patient's allergies indicates:   Allergen Reactions    Budeprion sr [bupropion hcl] Palpitations    Codeine     Effexor [venlafaxine] Other (See Comments)     vomiting    Sulfa (sulfonamide antibiotics)          Review of Systems -   GEN ROS: negative for - fever  Breast ROS: negative for breast lumps  Genito-Urinary ROS: vulvar dystrophy      Physical Examination:  BP (!) 168/98   Wt 79.7 kg (175 lb 11.3 oz)   BMI 28.36 kg/m²    Abd: non tender  V,v; no lesions, reolved      Assessment:  Improved post temovate  1. Vulvar dystrophy            Plan:  annual  Patient informed will be contacted with results within 2 weeks. Encouraged to please call back or email if she has not heard from us by then.

## 2023-01-13 PROBLEM — T40.3X1A: Status: ACTIVE | Noted: 2023-01-13

## 2023-01-13 PROBLEM — D69.6 THROMBOCYTOPENIA, UNSPECIFIED: Status: ACTIVE | Noted: 2023-01-13

## 2023-03-24 ENCOUNTER — OFFICE VISIT (OUTPATIENT)
Dept: PAIN MEDICINE | Facility: CLINIC | Age: 78
End: 2023-03-24
Payer: MEDICARE

## 2023-03-24 ENCOUNTER — HOSPITAL ENCOUNTER (OUTPATIENT)
Dept: RADIOLOGY | Facility: HOSPITAL | Age: 78
Discharge: HOME | End: 2023-03-24
Attending: PHYSICIAN ASSISTANT
Payer: MEDICARE

## 2023-03-24 VITALS
HEIGHT: 66 IN | HEART RATE: 77 BPM | BODY MASS INDEX: 27.07 KG/M2 | DIASTOLIC BLOOD PRESSURE: 85 MMHG | WEIGHT: 168.44 LBS | SYSTOLIC BLOOD PRESSURE: 144 MMHG

## 2023-03-24 DIAGNOSIS — S32.020A COMPRESSION FRACTURE OF L2 VERTEBRA, INITIAL ENCOUNTER: ICD-10-CM

## 2023-03-24 DIAGNOSIS — S32.020A COMPRESSION FRACTURE OF L2 VERTEBRA, INITIAL ENCOUNTER: Primary | ICD-10-CM

## 2023-03-24 PROCEDURE — 3077F PR MOST RECENT SYSTOLIC BLOOD PRESSURE >= 140 MM HG: ICD-10-PCS | Mod: CPTII,S$GLB,, | Performed by: PHYSICIAN ASSISTANT

## 2023-03-24 PROCEDURE — 1159F PR MEDICATION LIST DOCUMENTED IN MEDICAL RECORD: ICD-10-PCS | Mod: CPTII,S$GLB,, | Performed by: PHYSICIAN ASSISTANT

## 2023-03-24 PROCEDURE — 99203 PR OFFICE/OUTPT VISIT, NEW, LEVL III, 30-44 MIN: ICD-10-PCS | Mod: S$GLB,,, | Performed by: PHYSICIAN ASSISTANT

## 2023-03-24 PROCEDURE — 1100F PR PT FALLS ASSESS DOC 2+ FALLS/FALL W/INJURY/YR: ICD-10-PCS | Mod: CPTII,S$GLB,, | Performed by: PHYSICIAN ASSISTANT

## 2023-03-24 PROCEDURE — 1100F PTFALLS ASSESS-DOCD GE2>/YR: CPT | Mod: CPTII,S$GLB,, | Performed by: PHYSICIAN ASSISTANT

## 2023-03-24 PROCEDURE — 99999 PR PBB SHADOW E&M-EST. PATIENT-LVL IV: ICD-10-PCS | Mod: PBBFAC,,, | Performed by: PHYSICIAN ASSISTANT

## 2023-03-24 PROCEDURE — 72100 X-RAY EXAM L-S SPINE 2/3 VWS: CPT | Mod: 26,,, | Performed by: RADIOLOGY

## 2023-03-24 PROCEDURE — 1160F RVW MEDS BY RX/DR IN RCRD: CPT | Mod: CPTII,S$GLB,, | Performed by: PHYSICIAN ASSISTANT

## 2023-03-24 PROCEDURE — 3077F SYST BP >= 140 MM HG: CPT | Mod: CPTII,S$GLB,, | Performed by: PHYSICIAN ASSISTANT

## 2023-03-24 PROCEDURE — 3079F PR MOST RECENT DIASTOLIC BLOOD PRESSURE 80-89 MM HG: ICD-10-PCS | Mod: CPTII,S$GLB,, | Performed by: PHYSICIAN ASSISTANT

## 2023-03-24 PROCEDURE — 3288F PR FALLS RISK ASSESSMENT DOCUMENTED: ICD-10-PCS | Mod: CPTII,S$GLB,, | Performed by: PHYSICIAN ASSISTANT

## 2023-03-24 PROCEDURE — 99203 OFFICE O/P NEW LOW 30 MIN: CPT | Mod: S$GLB,,, | Performed by: PHYSICIAN ASSISTANT

## 2023-03-24 PROCEDURE — 99999 PR PBB SHADOW E&M-EST. PATIENT-LVL IV: CPT | Mod: PBBFAC,,, | Performed by: PHYSICIAN ASSISTANT

## 2023-03-24 PROCEDURE — 1159F MED LIST DOCD IN RCRD: CPT | Mod: CPTII,S$GLB,, | Performed by: PHYSICIAN ASSISTANT

## 2023-03-24 PROCEDURE — 1125F PR PAIN SEVERITY QUANTIFIED, PAIN PRESENT: ICD-10-PCS | Mod: CPTII,S$GLB,, | Performed by: PHYSICIAN ASSISTANT

## 2023-03-24 PROCEDURE — 72100 X-RAY EXAM L-S SPINE 2/3 VWS: CPT | Mod: TC,PO

## 2023-03-24 PROCEDURE — 1160F PR REVIEW ALL MEDS BY PRESCRIBER/CLIN PHARMACIST DOCUMENTED: ICD-10-PCS | Mod: CPTII,S$GLB,, | Performed by: PHYSICIAN ASSISTANT

## 2023-03-24 PROCEDURE — 1125F AMNT PAIN NOTED PAIN PRSNT: CPT | Mod: CPTII,S$GLB,, | Performed by: PHYSICIAN ASSISTANT

## 2023-03-24 PROCEDURE — 3079F DIAST BP 80-89 MM HG: CPT | Mod: CPTII,S$GLB,, | Performed by: PHYSICIAN ASSISTANT

## 2023-03-24 PROCEDURE — 72100 XR LUMBAR SPINE AP AND LATERAL: ICD-10-PCS | Mod: 26,,, | Performed by: RADIOLOGY

## 2023-03-24 PROCEDURE — 3288F FALL RISK ASSESSMENT DOCD: CPT | Mod: CPTII,S$GLB,, | Performed by: PHYSICIAN ASSISTANT

## 2023-03-24 RX ORDER — TIZANIDINE 4 MG/1
4 TABLET ORAL NIGHTLY PRN
Qty: 40 TABLET | Refills: 0 | Status: SHIPPED | OUTPATIENT
Start: 2023-03-24 | End: 2023-08-04 | Stop reason: ALTCHOICE

## 2023-03-24 NOTE — PROGRESS NOTES
[facility] New Patient Evaluation      Referred by: Loren Garza    PCP:  Elgin Gary Jr, MD    CC:   Chief Complaint   Patient presents with    Low-back Pain     L2 compression fracture after fall.      No flowsheet data found.      HPI:   Nany Pratt is a 77 y.o. female with history of hypertension, IBS, osteopenia, depression presents with acute onset lower back pain after a fall. She fell from 2-step ladder landing on her buttocks about 2-25-23.  She developed pain I the midline lumbar region and acorss the lower lumbar region to the abdomen.  Denies any radicular leg pain, numbness or tingling.  She has tried tramadol and tyelnol for pain, but it persits.        Past and current medications:  Antineuropathics:  NSAIDs:    Antidepressants:  Muscle relaxers:  Opioids:  tramadol  Antiplatelets/Anticoagulants:  Others:  tylenol    Physical therapy/ Chiropractic care:  none    Pain Intervention History:  none    Past Spine Surgical History:  none      History:    Current Outpatient Medications:     DULoxetine 40 mg CpDR, Take 1 capsule by mouth once daily., Disp: , Rfl:     gabapentin (NEURONTIN) 100 MG capsule, Take 100 mg by mouth 2 (two) times daily., Disp: , Rfl:     levothyroxine (SYNTHROID) 75 MCG tablet, TAKE 1 TABLET(75 MCG) BY MOUTH BEFORE BREAKFAST, Disp: 90 tablet, Rfl: 3    metoprolol succinate (TOPROL-XL) 50 MG 24 hr tablet, TAKE 1 TABLET BY MOUTH EVERY DAY, Disp: 30 tablet, Rfl: 6    mirtazapine (REMERON) 30 MG tablet, Take 30 mg by mouth every evening., Disp: , Rfl:     multivitamin capsule, Take 1 capsule by mouth once daily., Disp: , Rfl:     ondansetron (ZOFRAN-ODT) 4 MG TbDL, Take 1 tablet (4 mg total) by mouth every 6 (six) hours as needed (nuasea)., Disp: 30 tablet, Rfl: 0    UNABLE TO FIND, Take 500 mg by mouth once daily at 6am. Viactive 500 mg 1 Tablet Daily, Disp: , Rfl:     tiZANidine (ZANAFLEX) 4 MG tablet, Take 1 tablet (4 mg total) by mouth nightly as needed (muscle  spasms)., Disp: 40 tablet, Rfl: 0    traMADoL (ULTRAM) 50 mg tablet, Take 1 tablet (50 mg total) by mouth every 8 (eight) hours as needed for Pain. (Patient not taking: Reported on 3/24/2023), Disp: 21 tablet, Rfl: 0  No current facility-administered medications for this visit.    Facility-Administered Medications Ordered in Other Visits:     EUFLEXXA injection Syrg 20 mg, 20 mg, Intra-articular, , Hayden Ramirez MD, 20 mg at 03/14/17 1640    Past Medical History:   Diagnosis Date    Depression     Disorder of bone and cartilage, unspecified     Dyslipidemia 9/30/2015    Gastric ulcer, unspecified as acute or chronic, without mention of hemorrhage, perforation, or obstruction     GERD (gastroesophageal reflux disease)     Hypertension     Hypothyroidism     Irritable bowel syndrome     Irritable bowel syndrome with constipation and diarrhea     Mental disorder     Mitral valve insufficiency and aortic valve insufficiency     Nontoxic uninodular goiter     Occlusion and stenosis of carotid artery without mention of cerebral infarction     Paroxysmal tachycardia, unspecified     Premature supraventricular beats 9/30/2015    Rheumatic tricuspid valve regurgitation     Symptomatic menopausal or female climacteric states        Past Surgical History:   Procedure Laterality Date    APPENDECTOMY      AUGMENTATION OF BREAST      BREAST BIOPSY Left     over 15 years ago    BREAST SURGERY Bilateral     implants ruptured right implant    COLONOSCOPY  05/26/2011    Done by Dr. OSVALDO Fabian -- report in [facility]    COLONOSCOPY N/A 6/20/2016    Procedure: COLONOSCOPY;  Surgeon: Luca Quevedo MD;  Location: [facility];  Service: Endoscopy;  Laterality: N/A;    COLONOSCOPY  12/08/2021    Dr. Quevedo    HYSTERECTOMY      TOTAL ABDOMINAL HYSTERECTOMY W/ BILATERAL SALPINGOOPHORECTOMY      age 34    TOTAL KNEE ARTHROPLASTY Right 03/16/2021    Done by Dr. Gray at [facility]       Family History   Problem Relation Age of Onset    Hypertension  "Mother     Osteoarthritis Mother     Diabetes Father     Hypertension Father     Stroke Father     Cancer Sister        Social History     Socioeconomic History    Marital status:      Spouse name: Evan    Number of children: 2   Occupational History    Occupation: retired   Tobacco Use    Smoking status: Never    Smokeless tobacco: Never   Substance and Sexual Activity    Alcohol use: Not Currently     Alcohol/week: 1.0 standard drink     Types: 1 Glasses of wine per week     Comment: rare    Drug use: Never    Sexual activity: Yes     Partners: Male     Birth control/protection: Post-menopausal, See Surgical Hx, None       Review of patient's allergies indicates:   Allergen Reactions    Budeprion sr [bupropion hcl] Palpitations    Codeine     Effexor [venlafaxine] Other (See Comments)     vomiting    Sulfa (sulfonamide antibiotics)        Review of Systems:  Low back pain.  Balance of review of systems is negative.    Physical Exam:  Vitals:    03/24/23 1401   BP: (!) 144/85   Pulse: 77   Weight: 76.4 kg (168 lb 6.9 oz)   Height: 5' 6" (1.676 m)   PainSc: 10-Worst pain ever   PainLoc: Back     Body mass index is 27.19 kg/m².    Gen: NAD  Psych: mood appropriate for given condition  HEENT: eyes anicteric   CV: RRR, 2+ radial pulse  HEENT: anicteric   Respiratory: non-labored, no signs of respiratory distress  Abd: non-distended  Skin: warm, dry and intact.  Gait: Able to heel walk, toe walk. No antalgic gait.     Coordination:   Romberg: negative  Finger to nose coordination: normal  Heel to shin coordination: normal  Tandem walking coordination: normal    Cervical spine:   ROM is full in flexion, extension and lateral rotation without increased pain.  Spurling's maneuver causes no neck pain to either side.  Myofascial exam: No Tenderness to palpation across cervical paraspinous region bilaterally.    Lumbar spine:   ROM is full with flexion extension and oblique extension with no increased pain.  "   Reno's test causes no increased pain on either side.    Supine straight leg raise is negative bilaterally.    Internal and external rotation of the hip causes no increased pain on either side.  Myofascial exam: No tenderness to palpation across lumbar paraspinous muscles. No tenderness to palpation over the bilateral greater trochanters and bilateral SI joint    Sensory:  Intact and symmetrical to light touch in C4-T1 dermatomes bilaterally. Intact and symmetrical to light touch in L1-S1 dermatomes bilaterally.    Motor:    Right Left   C4 Shoulder Abduction  5  5   C5 Elbow Flexion    5  5   C6 Wrist Extension  5  5   C7 Elbow Extension   5  5   C8/T1 Hand Intrinsics   5  5        Right Left   L2/3 Iliacus Hip flexion  5  5   L3/4 Qudratus Femoris Knee Extension  5  5   L4/5 Tib Anterior Ankle Dorsiflexion   5  5   L5/S1 Extensor Hallicus Longus Great toe extension  5  5   S1/S2 Gastroc/Soleus Plantar Flexion  5  5      Right Left   Triceps DTR 2+ 2+   Biceps DTR 2+ 2+   Brachioradialis DTR 2+ 2+   Patellar DTR 2+ 2+   Achilles DTR 2+ 2+   Duran Absent  Absent   Clonus Absent Absent   Babinski Absent Absent     Imaging:    Xray lumbar spine 3-1-23:  L2 vertebral body compression fracture with 40-50% height loss.  Mild retrolisthesis of L2 on L3.  Degenerateive changes at L4/5.    Compression fracture not previously seen on 2018 imaging.    Labs:  No results found for: LABA1C, HGBA1C    Lab Results   Component Value Date    WBC 6.00 01/13/2023    HGB 13.4 01/13/2023    HCT 39.4 01/13/2023    MCV 96 01/13/2023     01/13/2023           Assessment:     Ms. Ayon has acute onset L2 compression fracture with lower back pain.  No radicular complaints.  No neurological deficits.  Will try LSO brace - where when upright, walking and standing; remove for hygeine and laying flat in bed.  No lifting greater than 10 pounds.  Brace will help with pain.  I am prescribing zanalfex for spasms and pain.  Will reach  out to Dr. Leija to refill tramadol.  Xrays today and again in 2 weeks with clinid follow up.     Follow Up: RTC in 2 weeks..    : Not viewed.      Problem List Items Addressed This Visit    None  Visit Diagnoses       Compression fracture of L2 vertebra, initial encounter    -  Primary    Relevant Medications    tiZANidine (ZANAFLEX) 4 MG tablet    Other Relevant Orders    Back/Cervical Brace For Home Use    X-Ray Lumbar Spine Ap And Lateral    X-Ray Lumbar Spine Ap And Lateral                        Nu Niño PA-C  [facility]      This note was completed with dictation software and grammatical errors may exist.

## 2023-03-24 NOTE — Clinical Note
STEPHANIE Leija, Would yo consider prescribing tramadol to help with lower back for L2 compression fracture for this patient?  Thanks Nu Niño

## 2023-03-26 ENCOUNTER — TELEPHONE (OUTPATIENT)
Dept: PAIN MEDICINE | Facility: CLINIC | Age: 78
End: 2023-03-26
Payer: MEDICARE

## 2023-03-26 RX ORDER — TRAMADOL HYDROCHLORIDE 50 MG/1
50 TABLET ORAL 3 TIMES DAILY PRN
Qty: 20 TABLET | Refills: 0 | Status: SHIPPED | OUTPATIENT
Start: 2023-03-26 | End: 2023-04-05

## 2023-03-26 NOTE — TELEPHONE ENCOUNTER
----- Message from Nu Niño PA-C sent at 3/24/2023  3:02 PM CDT -----  STPEHANIE Leija,  Would yo consider prescribing tramadol to help with lower back for L2 compression fracture for this patient?    Thanks  Nu Niño

## 2023-03-27 ENCOUNTER — TELEPHONE (OUTPATIENT)
Dept: PAIN MEDICINE | Facility: CLINIC | Age: 78
End: 2023-03-27
Payer: MEDICARE

## 2023-03-27 NOTE — TELEPHONE ENCOUNTER
Please let her know that Dr. Leija did send in a tramadol refill over the weekend.  Because he prescribed medication, her follow up needs to be with him and not me in 2 weeks for fracture follow up.

## 2023-03-27 NOTE — TELEPHONE ENCOUNTER
----- Message from Darren Davis sent at 3/27/2023  2:25 PM CDT -----  Regarding: Return call  Contact: Patient  Type:  Patient Returning Call    Who Called:Patient  Who Left Message for Patient:Mile Conti  Does the patient know what this is regarding?:unknown  Would the patient rather a call back or a response via [facility]? call  Best Call Back Number:[phone]  Additional Information: Please call patient

## 2023-03-27 NOTE — TELEPHONE ENCOUNTER
I spoke with Mrs. Pratt and gave her the information as per Nu Niño regarding pain medicine and appointments, she indicated understanding.

## 2023-04-13 ENCOUNTER — HOSPITAL ENCOUNTER (OUTPATIENT)
Dept: RADIOLOGY | Facility: HOSPITAL | Age: 78
Discharge: HOME | End: 2023-04-13
Attending: PHYSICIAN ASSISTANT
Payer: MEDICARE

## 2023-04-13 DIAGNOSIS — S32.020A COMPRESSION FRACTURE OF L2 VERTEBRA, INITIAL ENCOUNTER: ICD-10-CM

## 2023-04-13 PROCEDURE — 72100 X-RAY EXAM L-S SPINE 2/3 VWS: CPT | Mod: TC,FY,PO

## 2023-04-13 PROCEDURE — 72100 X-RAY EXAM L-S SPINE 2/3 VWS: CPT | Mod: 26,,, | Performed by: RADIOLOGY

## 2023-04-13 PROCEDURE — 72100 XR LUMBAR SPINE AP AND LATERAL: ICD-10-PCS | Mod: 26,,, | Performed by: RADIOLOGY

## 2023-04-17 ENCOUNTER — OFFICE VISIT (OUTPATIENT)
Dept: PAIN MEDICINE | Facility: CLINIC | Age: 78
End: 2023-04-17
Payer: MEDICARE

## 2023-04-17 VITALS
WEIGHT: 168.44 LBS | SYSTOLIC BLOOD PRESSURE: 164 MMHG | HEART RATE: 99 BPM | DIASTOLIC BLOOD PRESSURE: 73 MMHG | BODY MASS INDEX: 27.07 KG/M2 | HEIGHT: 66 IN

## 2023-04-17 DIAGNOSIS — S32.020D COMPRESSION FRACTURE OF L2 LUMBAR VERTEBRA, WITH ROUTINE HEALING, SUBSEQUENT ENCOUNTER: Primary | ICD-10-CM

## 2023-04-17 PROCEDURE — 1100F PTFALLS ASSESS-DOCD GE2>/YR: CPT | Mod: CPTII,S$GLB,,

## 2023-04-17 PROCEDURE — 99213 OFFICE O/P EST LOW 20 MIN: CPT | Mod: S$GLB,,,

## 2023-04-17 PROCEDURE — 3078F DIAST BP <80 MM HG: CPT | Mod: CPTII,S$GLB,,

## 2023-04-17 PROCEDURE — 1100F PR PT FALLS ASSESS DOC 2+ FALLS/FALL W/INJURY/YR: ICD-10-PCS | Mod: CPTII,S$GLB,,

## 2023-04-17 PROCEDURE — 99213 PR OFFICE/OUTPT VISIT, EST, LEVL III, 20-29 MIN: ICD-10-PCS | Mod: S$GLB,,,

## 2023-04-17 PROCEDURE — 1125F AMNT PAIN NOTED PAIN PRSNT: CPT | Mod: CPTII,S$GLB,,

## 2023-04-17 PROCEDURE — 1125F PR PAIN SEVERITY QUANTIFIED, PAIN PRESENT: ICD-10-PCS | Mod: CPTII,S$GLB,,

## 2023-04-17 PROCEDURE — 99999 PR PBB SHADOW E&M-EST. PATIENT-LVL III: ICD-10-PCS | Mod: PBBFAC,,,

## 2023-04-17 PROCEDURE — 3078F PR MOST RECENT DIASTOLIC BLOOD PRESSURE < 80 MM HG: ICD-10-PCS | Mod: CPTII,S$GLB,,

## 2023-04-17 PROCEDURE — 99999 PR PBB SHADOW E&M-EST. PATIENT-LVL III: CPT | Mod: PBBFAC,,,

## 2023-04-17 PROCEDURE — 3288F PR FALLS RISK ASSESSMENT DOCUMENTED: ICD-10-PCS | Mod: CPTII,S$GLB,,

## 2023-04-17 PROCEDURE — 3077F SYST BP >= 140 MM HG: CPT | Mod: CPTII,S$GLB,,

## 2023-04-17 PROCEDURE — 1159F MED LIST DOCD IN RCRD: CPT | Mod: CPTII,S$GLB,,

## 2023-04-17 PROCEDURE — 3077F PR MOST RECENT SYSTOLIC BLOOD PRESSURE >= 140 MM HG: ICD-10-PCS | Mod: CPTII,S$GLB,,

## 2023-04-17 PROCEDURE — 3288F FALL RISK ASSESSMENT DOCD: CPT | Mod: CPTII,S$GLB,,

## 2023-04-17 PROCEDURE — 1159F PR MEDICATION LIST DOCUMENTED IN MEDICAL RECORD: ICD-10-PCS | Mod: CPTII,S$GLB,,

## 2023-04-17 NOTE — PROGRESS NOTES
[facility]      Referred by: No ref. provider found    PCP:  Elgin Gary Jr, MD    CC:   Chief Complaint   Patient presents with    Low-back Pain     Buttocks pain      No flowsheet data found.      Interval HPI 4/17/2023: Nany Pratt returns to the office for follow up.  She returns for follow-up and image review of known L2 compression fracture.  She reports significant improvement with her pain over the past few weeks.  She rates her pain today as a 2/10 and overall very tolerable.  She does report suffering an additional fall recently and worsening pain in her buttock in a different area than her compression fracture and most recent x-rays do not show any changes to her bony anatomy since the fall.  She denies any radiating pain, numbness, weakness or any changes to her bowel bladder function.  She is taking ibuprofen and Tylenol sparingly at this time.      HPI:   Nany Pratt is a 77 y.o. female with history of hypertension, IBS, osteopenia, depression presents with acute onset lower back pain after a fall. She fell from 2-step ladder landing on her buttocks about 2-25-23.  She developed pain I the midline lumbar region and acorss the lower lumbar region to the abdomen.  Denies any radicular leg pain, numbness or tingling.  She has tried tramadol and tyelnol for pain, but it persits.        Past and current medications:  Antineuropathics:  NSAIDs:    Antidepressants:  Muscle relaxers:  Opioids:  tramadol  Antiplatelets/Anticoagulants:  Others:  tylenol    Physical therapy/ Chiropractic care:  none    Pain Intervention History:  none    Past Spine Surgical History:  none      History:    Current Outpatient Medications:     DULoxetine 40 mg CpDR, Take 1 capsule by mouth once daily., Disp: , Rfl:     gabapentin (NEURONTIN) 100 MG capsule, Take 100 mg by mouth 2 (two) times daily., Disp: , Rfl:     levothyroxine (SYNTHROID) 75 MCG tablet, TAKE 1 TABLET(75 MCG) BY  MOUTH BEFORE BREAKFAST, Disp: 90 tablet, Rfl: 3    metoprolol succinate (TOPROL-XL) 50 MG 24 hr tablet, TAKE 1 TABLET BY MOUTH EVERY DAY, Disp: 30 tablet, Rfl: 6    mirtazapine (REMERON) 30 MG tablet, Take 30 mg by mouth every evening., Disp: , Rfl:     multivitamin capsule, Take 1 capsule by mouth once daily., Disp: , Rfl:     ondansetron (ZOFRAN-ODT) 4 MG TbDL, Take 1 tablet (4 mg total) by mouth every 6 (six) hours as needed (nuasea)., Disp: 30 tablet, Rfl: 0    tiZANidine (ZANAFLEX) 4 MG tablet, Take 1 tablet (4 mg total) by mouth nightly as needed (muscle spasms)., Disp: 40 tablet, Rfl: 0    traMADoL (ULTRAM) 50 mg tablet, Take 1 tablet (50 mg total) by mouth every 8 (eight) hours as needed for Pain., Disp: 21 tablet, Rfl: 0    UNABLE TO FIND, Take 500 mg by mouth once daily at 6am. Viactive 500 mg 1 Tablet Daily, Disp: , Rfl:   No current facility-administered medications for this visit.    Facility-Administered Medications Ordered in Other Visits:     EUFLEXXA injection Syrg 20 mg, 20 mg, Intra-articular, , Hayden Ramirez MD, 20 mg at 03/14/17 1640    Past Medical History:   Diagnosis Date    Depression     Disorder of bone and cartilage, unspecified     Dyslipidemia 9/30/2015    Gastric ulcer, unspecified as acute or chronic, without mention of hemorrhage, perforation, or obstruction     GERD (gastroesophageal reflux disease)     Hypertension     Hypothyroidism     Irritable bowel syndrome     Irritable bowel syndrome with constipation and diarrhea     Mental disorder     Mitral valve insufficiency and aortic valve insufficiency     Nontoxic uninodular goiter     Occlusion and stenosis of carotid artery without mention of cerebral infarction     Paroxysmal tachycardia, unspecified     Premature supraventricular beats 9/30/2015    Rheumatic tricuspid valve regurgitation     Symptomatic menopausal or female climacteric states        Past Surgical History:   Procedure Laterality Date    APPENDECTOMY       "AUGMENTATION OF BREAST      BREAST BIOPSY Left     over 15 years ago    BREAST SURGERY Bilateral     implants ruptured right implant    COLONOSCOPY  05/26/2011    Done by Dr. OSVALDO Fabian -- report in [facility]PS    COLONOSCOPY N/A 6/20/2016    Procedure: COLONOSCOPY;  Surgeon: Luca Quevedo MD;  Location: [facility];  Service: Endoscopy;  Laterality: N/A;    COLONOSCOPY  12/08/2021    Dr. Quevedo    HYSTERECTOMY      TOTAL ABDOMINAL HYSTERECTOMY W/ BILATERAL SALPINGOOPHORECTOMY      age 34    TOTAL KNEE ARTHROPLASTY Right 03/16/2021    Done by Dr. Gray at [facility]       Family History   Problem Relation Age of Onset    Hypertension Mother     Osteoarthritis Mother     Diabetes Father     Hypertension Father     Stroke Father     Cancer Sister        Social History     Socioeconomic History    Marital status:      Spouse name: Evan    Number of children: 2   Occupational History    Occupation: retired   Tobacco Use    Smoking status: Never    Smokeless tobacco: Never   Substance and Sexual Activity    Alcohol use: Not Currently     Alcohol/week: 1.0 standard drink     Types: 1 Glasses of wine per week     Comment: rare    Drug use: Never    Sexual activity: Yes     Partners: Male     Birth control/protection: Post-menopausal, See Surgical Hx, None       Review of patient's allergies indicates:   Allergen Reactions    Budeprion sr [bupropion hcl] Palpitations    Codeine     Effexor [venlafaxine] Other (See Comments)     vomiting    Sulfa (sulfonamide antibiotics)        Review of Systems:  Low back pain.  Balance of review of systems is negative.    Physical Exam:  Vitals:    04/17/23 1447   BP: (!) 164/73   Pulse: 99   Weight: 76.4 kg (168 lb 6.9 oz)   Height: 5' 6" (1.676 m)   PainSc:   2   PainLoc: Buttocks     Body mass index is 27.19 kg/m².    Gen: NAD  Psych: mood appropriate for given condition  HEENT: eyes anicteric   CV: RRR, 2+ radial pulse  HEENT: anicteric   Respiratory: non-labored, no signs of " respiratory distress  Abd: non-distended  Skin: warm, dry and intact.  Gait: Able to heel walk, toe walk. No antalgic gait.     Coordination:   Romberg: negative  Finger to nose coordination: normal  Heel to shin coordination: normal  Tandem walking coordination: normal    Cervical spine:   ROM is full in flexion, extension and lateral rotation without increased pain.  Spurling's maneuver causes no neck pain to either side.  Myofascial exam: No Tenderness to palpation across cervical paraspinous region bilaterally.    Lumbar spine:   ROM is full with flexion extension and oblique extension with no increased pain.    Reno's test causes no increased pain on either side.    Supine straight leg raise is negative bilaterally.    Internal and external rotation of the hip causes no increased pain on either side.  Myofascial exam: No tenderness to palpation across lumbar paraspinous muscles. No tenderness to palpation over the bilateral greater trochanters and bilateral SI joint    Sensory:  Intact and symmetrical to light touch in C4-T1 dermatomes bilaterally. Intact and symmetrical to light touch in L1-S1 dermatomes bilaterally.    Motor:       Right Left   L2/3 Iliacus Hip flexion  5  5   L3/4 Qudratus Femoris Knee Extension  5  5   L4/5 Tib Anterior Ankle Dorsiflexion   5  5   L5/S1 Extensor Hallicus Longus Great toe extension  5  5   S1/S2 Gastroc/Soleus Plantar Flexion  5  5      Right Left   Triceps DTR     Biceps DTR     Brachioradialis DTR     Patellar DTR 2+ 2+   Achilles DTR 2+ 2+   Duran Absent  Absent   Clonus Absent Absent   Babinski Absent Absent     Imaging:    Xray lumbar spine 3-1-23:  L2 vertebral body compression fracture with 40-50% height loss.  Mild retrolisthesis of L2 on L3.  Degenerateive changes at L4/5.    Compression fracture not previously seen on 2018 imaging.    X-ray lumbar spine 04/13/2023  FINDINGS:  Levocurvature of the lumbar spine.  Unchanged superior endplate height loss of  approximately 50% at the L2 vertebral body with mild, 6 mm retropulsion of the fracture fragments.  Otherwise vertebral body heights are maintained.  Intervertebral disc space height loss with degenerative endplate change at L4-L5.  Lower lumbar facet arthrosis.  No new acute fracture     Impression:     Unchanged appearance of L2 compression deformity with mild retropulsion.  No acute abnormality.    Labs:  No results found for: LABA1C, HGBA1C    Lab Results   Component Value Date    WBC 6.00 01/13/2023    HGB 13.4 01/13/2023    HCT 39.4 01/13/2023    MCV 96 01/13/2023     01/13/2023           Assessment:     Ms. Ayon has acute onset L2 compression fracture with lower back pain.  No radicular complaints.  No neurological deficits.  Will try LSO brace - where when upright, walking and standing; remove for hygeine and laying flat in bed.  No lifting greater than 10 pounds.  Brace will help with pain.  I am prescribing zanalfex for spasms and pain.  Will reach out to Dr. Leija to refill tramadol.  Xrays today and again in 2 weeks with clinid follow up.       4/17/2023: Nany Pratt returns to the office for follow up.  She returns for follow-up and image review of known L2 compression fracture.  She reports significant improvement with her pain over the past few weeks.  She rates her pain today as a 2/10 and overall very tolerable.  She does report suffering an additional fall recently and worsening pain in her buttock in a different area than her compression fracture and most recent x-rays do not show any changes to her bony anatomy since the fall.  She denies any radiating pain, numbness, weakness or any changes to her bowel bladder function.  She is taking ibuprofen and Tylenol sparingly at this time.    - on exam she is full strength  - she is recovering well from the compression fracture and pain is significantly improved.  - her pain is currently tolerable and I think her compression fracture  has healed well.  - discussed with her to either follow-up with endocrinology or her primary care for any oral bone density supplementation or potentially IV Reclast.  - follow up as needed        : Not viewed.      Problem List Items Addressed This Visit    None  Visit Diagnoses       Compression fracture of L2 lumbar vertebra, with routine healing, subsequent encounter    -  Primary                This note was completed with dictation software and grammatical errors may exist.

## 2023-11-07 ENCOUNTER — TELEPHONE (OUTPATIENT)
Dept: OBSTETRICS AND GYNECOLOGY | Facility: CLINIC | Age: 78
End: 2023-11-07
Payer: MEDICARE

## 2023-11-07 DIAGNOSIS — M80.08XA AGE-RELATED OSTEOPOROSIS WITH CURRENT PATHOLOGICAL FRACTURE, VERTEBRA(E), INITIAL ENCOUNTER FOR FRACTURE: ICD-10-CM

## 2023-11-07 DIAGNOSIS — M80.00XA AGE-RELATED OSTEOPOROSIS WITH CURRENT PATHOLOGICAL FRACTURE, INITIAL ENCOUNTER: ICD-10-CM

## 2023-11-07 DIAGNOSIS — Z12.31 ENCOUNTER FOR SCREENING MAMMOGRAM FOR MALIGNANT NEOPLASM OF BREAST: Primary | ICD-10-CM

## 2023-11-07 NOTE — TELEPHONE ENCOUNTER
----- Message from Zeinab Logan sent at 11/7/2023  3:51 PM CST -----  Type:  Mammogram    Caller is requesting to schedule their annual mammogram appointment.  Order is not listed in EPIC.  Please enter order and contact patient to schedule.    Name of Caller:  Pt    Where would they like the mammogram performed?  [facility]    Would the patient rather a call back or a response via [facility]?  Call back    Best Call Back Number:  [phone]    Additional Information:  Pt is wanting a mammogram and bone density scan.  Please call back to advise. Thanks!

## 2023-12-06 ENCOUNTER — OFFICE VISIT (OUTPATIENT)
Dept: OBSTETRICS AND GYNECOLOGY | Facility: CLINIC | Age: 78
End: 2023-12-06
Payer: MEDICARE

## 2023-12-06 VITALS — BODY MASS INDEX: 27.32 KG/M2 | HEIGHT: 66 IN | WEIGHT: 170 LBS

## 2023-12-06 DIAGNOSIS — Z01.419 ENCOUNTER FOR ANNUAL ROUTINE GYNECOLOGICAL EXAMINATION: Primary | ICD-10-CM

## 2023-12-06 DIAGNOSIS — N90.4 VULVAR DYSTROPHY: ICD-10-CM

## 2023-12-06 PROCEDURE — 1159F MED LIST DOCD IN RCRD: CPT | Mod: CPTII,S$GLB,, | Performed by: OBSTETRICS & GYNECOLOGY

## 2023-12-06 PROCEDURE — 1126F AMNT PAIN NOTED NONE PRSNT: CPT | Mod: CPTII,S$GLB,, | Performed by: OBSTETRICS & GYNECOLOGY

## 2023-12-06 PROCEDURE — 1101F PR PT FALLS ASSESS DOC 0-1 FALLS W/OUT INJ PAST YR: ICD-10-PCS | Mod: CPTII,S$GLB,, | Performed by: OBSTETRICS & GYNECOLOGY

## 2023-12-06 PROCEDURE — 99999 PR PBB SHADOW E&M-EST. PATIENT-LVL III: CPT | Mod: PBBFAC,,, | Performed by: OBSTETRICS & GYNECOLOGY

## 2023-12-06 PROCEDURE — G0101 CA SCREEN;PELVIC/BREAST EXAM: HCPCS | Mod: S$GLB,,, | Performed by: OBSTETRICS & GYNECOLOGY

## 2023-12-06 PROCEDURE — 1159F PR MEDICATION LIST DOCUMENTED IN MEDICAL RECORD: ICD-10-PCS | Mod: CPTII,S$GLB,, | Performed by: OBSTETRICS & GYNECOLOGY

## 2023-12-06 PROCEDURE — 3288F FALL RISK ASSESSMENT DOCD: CPT | Mod: CPTII,S$GLB,, | Performed by: OBSTETRICS & GYNECOLOGY

## 2023-12-06 PROCEDURE — 1160F PR REVIEW ALL MEDS BY PRESCRIBER/CLIN PHARMACIST DOCUMENTED: ICD-10-PCS | Mod: CPTII,S$GLB,, | Performed by: OBSTETRICS & GYNECOLOGY

## 2023-12-06 PROCEDURE — 3288F PR FALLS RISK ASSESSMENT DOCUMENTED: ICD-10-PCS | Mod: CPTII,S$GLB,, | Performed by: OBSTETRICS & GYNECOLOGY

## 2023-12-06 PROCEDURE — G0101 PR CA SCREEN;PELVIC/BREAST EXAM: ICD-10-PCS | Mod: S$GLB,,, | Performed by: OBSTETRICS & GYNECOLOGY

## 2023-12-06 PROCEDURE — 1160F RVW MEDS BY RX/DR IN RCRD: CPT | Mod: CPTII,S$GLB,, | Performed by: OBSTETRICS & GYNECOLOGY

## 2023-12-06 PROCEDURE — 1101F PT FALLS ASSESS-DOCD LE1/YR: CPT | Mod: CPTII,S$GLB,, | Performed by: OBSTETRICS & GYNECOLOGY

## 2023-12-06 PROCEDURE — 99999 PR PBB SHADOW E&M-EST. PATIENT-LVL III: ICD-10-PCS | Mod: PBBFAC,,, | Performed by: OBSTETRICS & GYNECOLOGY

## 2023-12-06 PROCEDURE — 1126F PR PAIN SEVERITY QUANTIFIED, NO PAIN PRESENT: ICD-10-PCS | Mod: CPTII,S$GLB,, | Performed by: OBSTETRICS & GYNECOLOGY

## 2023-12-06 RX ORDER — NYSTATIN AND TRIAMCINOLONE ACETONIDE 100000; 1 [USP'U]/G; MG/G
CREAM TOPICAL
Qty: 30 G | Refills: 1 | Status: SHIPPED | OUTPATIENT
Start: 2023-12-06 | End: 2024-12-05

## 2023-12-06 RX ORDER — CLOBETASOL PROPIONATE 0.5 MG/G
OINTMENT TOPICAL 2 TIMES DAILY
Qty: 1 EACH | Refills: 1 | Status: SHIPPED | OUTPATIENT
Start: 2023-12-06

## 2023-12-06 NOTE — PROGRESS NOTES
Chief Complaint   Patient presents with    Well Woman       History and Physical:  No LMP recorded. Patient has had a hysterectomy.       Nany Pratt is a 78 y.o. WF who presents today for her routine annual GYN exam. The patient has no Gynecology complaints today.       Allergies:   Review of patient's allergies indicates:   Allergen Reactions    Budeprion sr [bupropion hcl] Palpitations    Codeine Other (See Comments)    Effexor [venlafaxine] Other (See Comments)     vomiting    Sulfa (sulfonamide antibiotics) Other (See Comments)       Past Medical History:   Diagnosis Date    Depression     Disorder of bone and cartilage, unspecified     Dyslipidemia 9/30/2015    Gastric ulcer, unspecified as acute or chronic, without mention of hemorrhage, perforation, or obstruction     GERD (gastroesophageal reflux disease)     Hypertension     Hypothyroidism     Irritable bowel syndrome     Irritable bowel syndrome with constipation and diarrhea     Mental disorder     Mitral valve insufficiency and aortic valve insufficiency     Nontoxic uninodular goiter     Occlusion and stenosis of carotid artery without mention of cerebral infarction     Paroxysmal tachycardia, unspecified     Premature supraventricular beats 9/30/2015    Rheumatic tricuspid valve regurgitation     Symptomatic menopausal or female climacteric states        Past Surgical History:   Procedure Laterality Date    APPENDECTOMY      AUGMENTATION OF BREAST      BREAST BIOPSY Left     over 15 years ago    BREAST SURGERY Bilateral     implants ruptured right implant    COLONOSCOPY  05/26/2011    Done by Dr. OSVALDO Fabian -- report in [facility]    COLONOSCOPY N/A 6/20/2016    Procedure: COLONOSCOPY;  Surgeon: Luca Quevedo MD;  Location: [facility];  Service: Endoscopy;  Laterality: N/A;    COLONOSCOPY  12/08/2021    Dr. Quevedo    HYSTERECTOMY      TOTAL ABDOMINAL HYSTERECTOMY W/ BILATERAL SALPINGOOPHORECTOMY      age 34    TOTAL KNEE ARTHROPLASTY Right  03/16/2021    Done by Dr. Gray at [facility]:   Current Outpatient Medications on File Prior to Visit   Medication Sig Dispense Refill    DULoxetine 40 mg CpDR Take 1 capsule by mouth once daily.      gabapentin (NEURONTIN) 100 MG capsule Take 100 mg by mouth 2 (two) times daily.      levothyroxine (SYNTHROID) 75 MCG tablet Take 1 tablet (75 mcg total) by mouth before breakfast. 90 tablet 3    metoprolol succinate (TOPROL-XL) 50 MG 24 hr tablet TAKE 1 TABLET BY MOUTH EVERY DAY 30 tablet 6    mirtazapine (REMERON) 30 MG tablet Take 30 mg by mouth every evening.      multivitamin capsule Take 1 capsule by mouth once daily.      ondansetron (ZOFRAN-ODT) 4 MG TbDL Take 1 tablet (4 mg total) by mouth every 6 (six) hours as needed ([employer]). 30 tablet 0    UNABLE TO FIND Take 500 mg by mouth once daily at 6am. Viactive 500 mg 1 Tablet Daily       Current Facility-Administered Medications on File Prior to Visit   Medication Dose Route Frequency Provider Last Rate Last Admin    EUFLEXXA injection Syrg 20 mg  20 mg Intra-articular  Hayden Ramirez MD   20 mg at 03/14/17 1640       OB History    No obstetric history on file.         Social History     Socioeconomic History    Marital status:      Spouse name: Evan    Number of children: 2   Occupational History    Occupation: retired   Tobacco Use    Smoking status: Never    Smokeless tobacco: Never   Substance and Sexual Activity    Alcohol use: Not Currently     Alcohol/week: 1.0 standard drink of alcohol     Types: 1 Glasses of wine per week     Comment: rare    Drug use: Never    Sexual activity: Yes     Partners: Male     Birth control/protection: Post-menopausal, See Surgical Hx, None     Social Determinants of Health     Stress: No Stress Concern Present (8/26/2019)    [employer] - Occupational Stress Questionnaire     Feeling of Stress : Not at all       Family History   Problem Relation Age of Onset    Hypertension Mother   "   Osteoarthritis Mother     Diabetes Father     Hypertension Father     Stroke Father     Cancer Sister          Past medical and surgical history reviewed.   I have reviewed the patient's medical history in detail and updated the computerized patient record.        Review of System:   General: no chills, fever, night sweats, weight gain or weight loss  Psychological: no depression or suicidal ideation  Breasts: no new or changing breast lumps, nipple discharge or masses.  Respiratory: no cough, shortness of breath, or wheezing  Cardiovascular: no chest pain or dyspnea on exertion  Gastrointestinal: no abdominal pain, change in bowel habits, or black or bloody stools  Genito-Urinary: no incontinence, urinary frequency/urgency or vulvar/vaginal symptoms, pelvic pain or abnormal vaginal bleeding.  Musculoskeletal: no gait disturbance or muscular weakness      Physical Exam:   Ht 5' 6" (1.676 m)   Wt 77.1 kg (169 lb 15.6 oz)   BMI 27.43 kg/m²   Constitutional: She is oriented to person, place, and time. She appears well-developed and well-nourished. No distress.  HENT:   Head: Normocephalic and atraumatic.   Eyes: Conjunctivae and EOM are normal. No scleral icterus.   Neck: Normal range of motion. Neck supple. No tracheal deviation present.   Cardiovascular: Normal rate.    Pulmonary/Chest: Effort normal. No respiratory distress. She exhibits no tenderness.  Breasts: are symmetrical.no masses    Right breast exhibits no inverted nipple, no mass, no nipple discharge, no skin change and no tenderness.   Left breast exhibits no inverted nipple, no mass, no nipple discharge, no skin change and no tenderness.  Abdominal: Soft. She exhibits no distension and no mass. There is no tenderness. There is no rebound and no guarding.   Genitourinary:    External rectal exam shows no thrombosed external hemorrhoids.    Pelvic exam was performed with patient supine.   No labial fusion. No current evidence of lichen sclerosus   " There is no rash, lesion or injury on the right labia.   There is no rash, lesion or injury on the left labia.   No bleeding and no signs of injury around the vaginal introitus, urethra is without lesions and well supported.    No vaginal discharge found.    No significant Cystocele, Enterocele or rectocele, and cuff well supported.   Bimanual exam:   The urethra and vagina are without palpable masses or tenderness.   Uterus and cervix are surgically absents, vaginal cuff is intact and well supported.   Right adnexum displays no mass and no tenderness.   Left adnexum displays no mass and no tenderness.  Musculoskeletal: Normal range of motion.   Lymphadenopathy: No inguinal adenopathy present.   Neurological: She is alert and oriented to person, place, and time. Coordination normal.   Skin: Skin is warm and dry. She is not diaphoretic.   Psychiatric: She has a normal mood and affect.        Assessment:   Normal annual GYN exam  1. Encounter for annual routine gynecological examination        2. Vulvar dystrophy              Plan:   PAP  Not Needed  Mammogram  Mycolog prn/temovate when needed if no relief with mycolog  Follow up in 1 year.

## 2023-12-19 ENCOUNTER — TELEPHONE (OUTPATIENT)
Dept: OBSTETRICS AND GYNECOLOGY | Facility: CLINIC | Age: 78
End: 2023-12-19
Payer: MEDICARE

## 2023-12-19 NOTE — TELEPHONE ENCOUNTER
Informed patient of results and doctor's message in regard to DXA scan. Patient verbalized understanding.    ----- Message from Harini Gutierrez sent at 12/18/2023  3:28 PM CST -----  Contact: pt  Type:  Patient Returning Call    Who Called:pt    Who Left Message for Patient:office    Does the patient know what this is regarding?:results    Would the patient rather a call back or a response via [facility]? Call back    Best Call Back Number:[phone]    Additional Information: please call to advise  Thanks

## 2024-08-07 PROBLEM — I48.91 ATRIAL FIBRILLATION WITH RVR: Status: ACTIVE | Noted: 2024-08-07

## 2024-08-07 PROBLEM — K21.9 GASTROESOPHAGEAL REFLUX: Chronic | Status: ACTIVE | Noted: 2017-11-27

## 2024-08-07 PROBLEM — R93.89 ABNORMAL CXR: Status: ACTIVE | Noted: 2024-08-07

## 2024-08-12 PROBLEM — F31.81 BIPOLAR II DISORDER: Status: ACTIVE | Noted: 2024-08-12

## 2024-08-12 PROBLEM — T40.3X1A: Status: RESOLVED | Noted: 2023-01-13 | Resolved: 2024-08-12

## 2024-08-12 PROBLEM — F31.81 BIPOLAR II DISORDER: Status: RESOLVED | Noted: 2024-08-12 | Resolved: 2024-08-12

## 2024-08-12 PROBLEM — D69.6 THROMBOCYTOPENIA, UNSPECIFIED: Status: RESOLVED | Noted: 2023-01-13 | Resolved: 2024-08-12

## 2024-08-14 RX ORDER — NYSTATIN AND TRIAMCINOLONE ACETONIDE 100000; 1 [USP'U]/G; MG/G
CREAM TOPICAL
Qty: 30 G | Refills: 1 | Status: SHIPPED | OUTPATIENT
Start: 2024-08-14

## 2025-02-15 PROBLEM — I48.91 ATRIAL FIBRILLATION: Chronic | Status: RESOLVED | Noted: 2024-08-07 | Resolved: 2025-02-15

## 2025-02-15 PROBLEM — D64.9 ANEMIA: Chronic | Status: ACTIVE | Noted: 2020-09-10

## 2025-02-15 PROBLEM — I48.91 ATRIAL FIBRILLATION: Chronic | Status: ACTIVE | Noted: 2024-08-07

## 2025-02-15 PROBLEM — F32.2 SEVERE MAJOR DEPRESSION: Chronic | Status: ACTIVE | Noted: 2022-08-01

## 2025-02-15 PROBLEM — I65.23 ATHEROSCLEROSIS OF BOTH CAROTID ARTERIES: Chronic | Status: ACTIVE | Noted: 2019-08-28

## 2025-04-23 ENCOUNTER — RESULTS FOLLOW-UP (OUTPATIENT)
Dept: OBSTETRICS AND GYNECOLOGY | Facility: CLINIC | Age: 80
End: 2025-04-23